# Patient Record
Sex: FEMALE | Race: WHITE | Employment: STUDENT | URBAN - METROPOLITAN AREA
[De-identification: names, ages, dates, MRNs, and addresses within clinical notes are randomized per-mention and may not be internally consistent; named-entity substitution may affect disease eponyms.]

---

## 2017-01-06 ENCOUNTER — TELEPHONE (OUTPATIENT)
Dept: OBGYN CLINIC | Facility: CLINIC | Age: 22
End: 2017-01-06

## 2017-01-26 ENCOUNTER — TELEPHONE (OUTPATIENT)
Dept: OBGYN CLINIC | Facility: CLINIC | Age: 22
End: 2017-01-26

## 2017-01-26 NOTE — TELEPHONE ENCOUNTER
Left message on home and cell for pt   To call regarding scheduling surgery. When is she wanting to schedule? Surgery form in [address].

## 2017-03-28 ENCOUNTER — ANESTHESIA EVENT (OUTPATIENT)
Dept: SURGERY | Facility: HOSPITAL | Age: 22
End: 2017-03-28
Payer: COMMERCIAL

## 2017-03-28 NOTE — H&P
[address] Patient Status:  Hospital Outpatient Surgery    1995 MRN [id number]   Location [facility] SURGERY Attending Maki Guerin MD   [facility] Day #  PCP Ayaan Cervantes MD     [address] History    Para Term  AB SAB TAB Ectopic Multiple Living   0 0 0 0 0 0 0 0 0 0             Past GYN History:   No complaints, regular menses    Social History:     Smoking status: Never Smoker     Smokeless tobacco: Not on file    Alcohol Use echogenicity. RIGHT OVARY:  4.06 cm x 2.29 cm x 2.44 cm    The right ovary appears normal in size, shape, and echogenicity. No significant masses are identified.   LEFT OVARY:  2.84 cm x 1.51 cm x 2.68 cm    The left ovary appears normal in size, shape, an

## 2017-03-29 ENCOUNTER — SURGERY (OUTPATIENT)
Age: 22
End: 2017-03-29

## 2017-03-29 ENCOUNTER — ANESTHESIA (OUTPATIENT)
Dept: SURGERY | Facility: HOSPITAL | Age: 22
End: 2017-03-29
Payer: COMMERCIAL

## 2017-03-29 ENCOUNTER — HOSPITAL ENCOUNTER (OUTPATIENT)
Facility: HOSPITAL | Age: 22
Setting detail: HOSPITAL OUTPATIENT SURGERY
Discharge: HOME | End: 2017-03-29
Attending: OBSTETRICS & GYNECOLOGY | Admitting: OBSTETRICS & GYNECOLOGY
Payer: COMMERCIAL

## 2017-03-29 VITALS
DIASTOLIC BLOOD PRESSURE: 81 MMHG | SYSTOLIC BLOOD PRESSURE: 123 MMHG | OXYGEN SATURATION: 100 % | HEIGHT: 67 IN | RESPIRATION RATE: 12 BRPM | WEIGHT: 243.63 LBS | TEMPERATURE: 98 F | BODY MASS INDEX: 38.24 KG/M2 | HEART RATE: 82 BPM

## 2017-03-29 LAB
POCT LOT NUMBER: NORMAL
POCT URINE PREGNANCY: NEGATIVE

## 2017-03-29 PROCEDURE — 58670 LAPAROSCOPY TUBAL CAUTERY: CPT | Performed by: OBSTETRICS & GYNECOLOGY

## 2017-03-29 PROCEDURE — 0U574ZZ DESTRUCTION OF BILATERAL FALLOPIAN TUBES, PERCUTANEOUS ENDOSCOPIC APPROACH: ICD-10-PCS | Performed by: OBSTETRICS & GYNECOLOGY

## 2017-03-29 RX ORDER — HYDROMORPHONE HYDROCHLORIDE 1 MG/ML
0.4 INJECTION, SOLUTION INTRAMUSCULAR; INTRAVENOUS; SUBCUTANEOUS EVERY 5 MIN PRN
Status: DISCONTINUED | OUTPATIENT
Start: 2017-03-29 | End: 2017-03-29

## 2017-03-29 RX ORDER — HYDROCODONE BITARTRATE AND ACETAMINOPHEN 5; 325 MG/1; MG/1
1 TABLET ORAL AS NEEDED
Status: DISCONTINUED | OUTPATIENT
Start: 2017-03-29 | End: 2017-03-29

## 2017-03-29 RX ORDER — NALOXONE HYDROCHLORIDE 0.4 MG/ML
80 INJECTION, SOLUTION INTRAMUSCULAR; INTRAVENOUS; SUBCUTANEOUS AS NEEDED
Status: DISCONTINUED | OUTPATIENT
Start: 2017-03-29 | End: 2017-03-29

## 2017-03-29 RX ORDER — MIDAZOLAM HYDROCHLORIDE 1 MG/ML
1 INJECTION INTRAMUSCULAR; INTRAVENOUS EVERY 5 MIN PRN
Status: DISCONTINUED | OUTPATIENT
Start: 2017-03-29 | End: 2017-03-29

## 2017-03-29 RX ORDER — SODIUM CHLORIDE, SODIUM LACTATE, POTASSIUM CHLORIDE, CALCIUM CHLORIDE 600; 310; 30; 20 MG/100ML; MG/100ML; MG/100ML; MG/100ML
INJECTION, SOLUTION INTRAVENOUS CONTINUOUS
Status: DISCONTINUED | OUTPATIENT
Start: 2017-03-29 | End: 2017-03-29

## 2017-03-29 RX ORDER — MEPERIDINE HYDROCHLORIDE 25 MG/ML
12.5 INJECTION INTRAMUSCULAR; INTRAVENOUS; SUBCUTANEOUS AS NEEDED
Status: DISCONTINUED | OUTPATIENT
Start: 2017-03-29 | End: 2017-03-29

## 2017-03-29 RX ORDER — ONDANSETRON 2 MG/ML
4 INJECTION INTRAMUSCULAR; INTRAVENOUS AS NEEDED
Status: DISCONTINUED | OUTPATIENT
Start: 2017-03-29 | End: 2017-03-29

## 2017-03-29 RX ORDER — SCOLOPAMINE TRANSDERMAL SYSTEM 1 MG/1
1 PATCH, EXTENDED RELEASE TRANSDERMAL ONCE
Status: DISCONTINUED | OUTPATIENT
Start: 2017-03-29 | End: 2017-03-29

## 2017-03-29 RX ORDER — HYDROCODONE BITARTRATE AND ACETAMINOPHEN 5; 325 MG/1; MG/1
1-2 TABLET ORAL EVERY 4 HOURS PRN
Qty: 20 TABLET | Refills: 0 | Status: SHIPPED | OUTPATIENT
Start: 2017-03-29 | End: 2017-04-24 | Stop reason: ALTCHOICE

## 2017-03-29 RX ORDER — ROPIVACAINE HYDROCHLORIDE 5 MG/ML
INJECTION, SOLUTION EPIDURAL; INFILTRATION; PERINEURAL AS NEEDED
Status: DISCONTINUED | OUTPATIENT
Start: 2017-03-29 | End: 2017-03-29 | Stop reason: HOSPADM

## 2017-03-29 RX ORDER — SCOLOPAMINE TRANSDERMAL SYSTEM 1 MG/1
PATCH, EXTENDED RELEASE TRANSDERMAL
Status: DISCONTINUED
Start: 2017-03-29 | End: 2017-03-29

## 2017-03-29 RX ORDER — HYDROCODONE BITARTRATE AND ACETAMINOPHEN 5; 325 MG/1; MG/1
2 TABLET ORAL AS NEEDED
Status: DISCONTINUED | OUTPATIENT
Start: 2017-03-29 | End: 2017-03-29

## 2017-03-29 NOTE — ANESTHESIA PREPROCEDURE EVALUATION
PRE-OP EVALUATION    Patient Name: Dulce Maria Elias    Pre-op Diagnosis: DESIRES STERILIZATION    Procedure(s):  LAPAROSCOPIC BILATERAL TUBAL LIGATION    Surgeon(s) and Role:     Alma Delia Pope MD - Primary    Pre-op vitals reviewed.         Body mass patient                Plan for GA with ETT placement. Detailed discussion of the risks and benefits of the proposed anesthetic was had in the preoperative area. Questions answered and patient wishes to proceed.        Present on Admission:   **None**

## 2017-03-29 NOTE — OPERATIVE REPORT
PREOPERATIVE DIAGNOSIS:  Desires sterilization    POSTOPERATIVE DIAGNOSIS:  Same                    PROCEDURE PERFORMED: Laparoscopic tubal ligation using electrodessication       SURGEON:  cherri    ANESTHESIA:  General.      ESTIMATED BLOOD LOSS:  5 cc to electrodessicate  A 3-4 cm section of both tubes. No trauma or injury was noted to surrounding organs. The procedure was terminated. The remaining instruments were removed. Abdomen was deflated.   The incisions were closed with a 4-0 Monocryl, and ster

## 2017-03-29 NOTE — BRIEF OP NOTE
[facility] SURGERY  Brief Op Note     Amber Cavanaugh Location: OR   CSN [id number] MRN [id number]   Admission Date 3/29/2017 Operation Date 3/29/2017   Attending Physician Hayden Chery MD Operating Physician Gagandeep Horne MD       Pre-Operati

## 2017-03-29 NOTE — ANESTHESIA POSTPROCEDURE EVALUATION
[address] [place] [address] Patient Status:  Hospital Outpatient Surgery   Age/Gender 19year old female MRN [id number]   [facility] SURGERY Attending Alanna France MD   Hosp Day # 0 PCP Teresa Beebe MD       Anesthesia Post-

## 2017-03-29 NOTE — DISCHARGE SUMMARY
[facility]  Discharge Summary    Dusty Aceves Patient Status:  Hospital Outpatient Surgery    1995 MRN [id number]   [facility] SURGERY Attending Hodan Finney MD   Hosp Day # 0 PCP Jameson Tamayo MD     Date of Admissi

## 2017-03-30 ENCOUNTER — TELEPHONE (OUTPATIENT)
Dept: OBGYN CLINIC | Facility: CLINIC | Age: 22
End: 2017-03-30

## 2017-03-30 NOTE — TELEPHONE ENCOUNTER
Pt calling to schedule Post op from 3/29/17    Please advise where we may make her an appt  [place] [facility] [place] complete;y full    Please advise

## 2017-04-15 ENCOUNTER — OFFICE VISIT (OUTPATIENT)
Dept: OBGYN CLINIC | Facility: CLINIC | Age: 22
End: 2017-04-15

## 2017-04-15 VITALS — BODY MASS INDEX: 30 KG/M2 | SYSTOLIC BLOOD PRESSURE: 110 MMHG | WEIGHT: 192 LBS | DIASTOLIC BLOOD PRESSURE: 60 MMHG

## 2017-04-15 DIAGNOSIS — Z98.890 POST-OPERATIVE STATE: Primary | ICD-10-CM

## 2017-04-15 PROCEDURE — 99024 POSTOP FOLLOW-UP VISIT: CPT | Performed by: OBSTETRICS & GYNECOLOGY

## 2017-04-24 ENCOUNTER — OFFICE VISIT (OUTPATIENT)
Dept: FAMILY MEDICINE CLINIC | Facility: CLINIC | Age: 22
End: 2017-04-24

## 2017-04-24 VITALS
OXYGEN SATURATION: 97 % | HEART RATE: 73 BPM | DIASTOLIC BLOOD PRESSURE: 78 MMHG | TEMPERATURE: 99 F | WEIGHT: 240.38 LBS | SYSTOLIC BLOOD PRESSURE: 116 MMHG | BODY MASS INDEX: 39.57 KG/M2 | HEIGHT: 65.5 IN | RESPIRATION RATE: 16 BRPM

## 2017-04-24 DIAGNOSIS — L02.214 ABSCESS OF GROIN, RIGHT: Primary | ICD-10-CM

## 2017-04-24 PROCEDURE — 99212 OFFICE O/P EST SF 10 MIN: CPT | Performed by: FAMILY MEDICINE

## 2017-04-24 RX ORDER — SULFAMETHOXAZOLE AND TRIMETHOPRIM 800; 160 MG/1; MG/1
1 TABLET ORAL 2 TIMES DAILY
Qty: 10 TABLET | Refills: 0 | Status: SHIPPED | OUTPATIENT
Start: 2017-04-24 | End: 2019-04-03

## 2017-04-24 NOTE — PATIENT INSTRUCTIONS
Bactrim DS (sulfa antibiotic) twice a day for 5 days to try to eliminate any remaining staph infection which is the probable cause of your boil/abscess/cyst. call if you get rash, nausea, diarrhea, fever or abdominal pain, or headaches or confusion. (rash

## 2017-04-24 NOTE — PROGRESS NOTES
Unique Antonio IS A 23year old female HERE FOR Patient presents with:  Cyst: cyst on right leg inner thigh since last Thursday. was hurting at first but now it is draining out       History of present illness:      This is first on groin, has had inguina History Main Topics   Smoking status: Never Smoker     Smokeless tobacco: Never Used    Alcohol Use: Yes  0.0 oz/week    0 Standard drinks or equivalent per week         Comment: occasionally    Drug Use: No    Sexual Activity: Yes    Partners: Male     Ot confusion. (rash or stomach symptoms are more common, fever or headache or confusion rarer.)

## 2017-11-29 ENCOUNTER — OFFICE VISIT (OUTPATIENT)
Dept: OBGYN CLINIC | Facility: CLINIC | Age: 22
End: 2017-11-29

## 2017-11-29 VITALS
DIASTOLIC BLOOD PRESSURE: 76 MMHG | SYSTOLIC BLOOD PRESSURE: 132 MMHG | BODY MASS INDEX: 36.8 KG/M2 | HEIGHT: 66 IN | WEIGHT: 229 LBS

## 2017-11-29 DIAGNOSIS — Z01.419 WELL FEMALE EXAM WITH ROUTINE GYNECOLOGICAL EXAM: Primary | ICD-10-CM

## 2017-11-29 DIAGNOSIS — L68.0 IDIOPATHIC HIRSUTISM: ICD-10-CM

## 2017-11-29 PROCEDURE — G0439 PPPS, SUBSEQ VISIT: HCPCS | Performed by: OBSTETRICS & GYNECOLOGY

## 2017-11-29 PROCEDURE — 99395 PREV VISIT EST AGE 18-39: CPT | Performed by: OBSTETRICS & GYNECOLOGY

## 2017-11-29 NOTE — PROGRESS NOTES
GYN H&P     2017  1:21 PM    CC: Patient is here for annual    HPI: patient is a 23year old  here for her annual gyn exam.   She has no complaints. Menses are regular. Denies any pelvic pain or irregular vaginal discharge.    Contraception: t/ History Main Topics   Smoking status: Never Smoker    Smokeless tobacco: Never Used    Alcohol use Yes  0.0 oz/week     Comment: occasionally    Drug use: No    Sexual activity: Yes    Partners: Male     Other Topics Concern     Service No    Blood LMP 11/17/2017 (Exact Date)   BMI 36.96 kg/m²   Exam:   GENERAL: well developed, well nourished, in no apparent distress  SKIN: no rashes, no suspicious lesions, generalized hirsuitism  HEENT: normal  NECK: supple; no thyroidmegaly, no adenopathy  LUNGS: c

## 2018-03-22 ENCOUNTER — OFFICE VISIT (OUTPATIENT)
Dept: FAMILY MEDICINE CLINIC | Facility: CLINIC | Age: 23
End: 2018-03-22

## 2018-03-22 VITALS
SYSTOLIC BLOOD PRESSURE: 124 MMHG | HEART RATE: 87 BPM | DIASTOLIC BLOOD PRESSURE: 80 MMHG | WEIGHT: 234 LBS | TEMPERATURE: 100 F | OXYGEN SATURATION: 97 % | HEIGHT: 66 IN | RESPIRATION RATE: 17 BRPM | BODY MASS INDEX: 37.61 KG/M2

## 2018-03-22 DIAGNOSIS — Z00.00 WELL ADULT EXAM: Primary | ICD-10-CM

## 2018-03-22 DIAGNOSIS — L68.0 HIRSUTISM: ICD-10-CM

## 2018-03-22 DIAGNOSIS — R79.89 LFT ELEVATION: ICD-10-CM

## 2018-03-22 DIAGNOSIS — Q83.9 CONGENITAL MALFORMATION OF BREAST: ICD-10-CM

## 2018-03-22 DIAGNOSIS — Z11.3 SCREEN FOR STD (SEXUALLY TRANSMITTED DISEASE): ICD-10-CM

## 2018-03-22 PROCEDURE — 99395 PREV VISIT EST AGE 18-39: CPT | Performed by: FAMILY MEDICINE

## 2018-03-22 PROCEDURE — 99213 OFFICE O/P EST LOW 20 MIN: CPT | Performed by: FAMILY MEDICINE

## 2018-03-22 NOTE — PROGRESS NOTES
Gualberto Lindo is a 23year old female here for Patient presents with: Well Adult: Physical no pap       HPI:   Patient complains of here for well exam.     Has a breast condition, drooping and she tends to have rounded slumped shoulder posture.  Father' Paternal Grandfather    • Other Gwenevere Taya Mother      gallstones hx   • Diabetes Paternal Aunt    • Diabetes Paternal Uncle        Social history:    Social History  Social History   Marital status: Single  Spouse name: N/A    Years of education: N/A  Number diarrhea, constipation, or blood in stool. : No pain, frequency, urgency or incontinence with urination.   OB/GYN: Not pregnant, menses regular, not excessive in amount, has some dysmenorrhea. + sexual activity since last exam.  Rheumatologic: HPI  Derm/ 03/29/2017 [id number]   Final   • Procedure Control 03/29/2017 ok   Final   • EXPIRATION DATE 03/29/2017 4/2018   Final           ASSESSMENT AND PLAN:   Jennifer Ute was seen today for well adult.     Diagnoses and all orders for this visit:    Hirsutism  -     FABRIZIO

## 2018-03-22 NOTE — PATIENT INSTRUCTIONS
Health screening: Pap recommended beginning at age 24 and every 3 years, repeat next year. STD screening: if sexually active, chlamydia and gonorrhea testing recommended if single and/or under age 22.  Tdap or Td recommended if tetanus not received for 10 y

## 2018-03-26 PROBLEM — H10.10 ALLERGIC CONJUNCTIVITIS: Status: ACTIVE | Noted: 2018-03-26

## 2018-03-26 PROBLEM — Q84.2: Status: ACTIVE | Noted: 2018-03-26

## 2018-03-26 PROBLEM — H91.92: Status: ACTIVE | Noted: 2018-03-26

## 2018-03-26 PROBLEM — K06.1 GINGIVAL HYPERPLASIA: Status: ACTIVE | Noted: 2018-03-26

## 2018-03-26 PROBLEM — J30.9 ALLERGIC RHINITIS: Status: ACTIVE | Noted: 2018-03-26

## 2018-03-26 PROBLEM — H91.91 HEARING LOSS IN RIGHT EAR: Status: ACTIVE | Noted: 2018-03-26

## 2018-04-04 ENCOUNTER — TELEPHONE (OUTPATIENT)
Dept: FAMILY MEDICINE CLINIC | Facility: CLINIC | Age: 23
End: 2018-04-04

## 2018-05-08 ENCOUNTER — OFFICE VISIT (OUTPATIENT)
Dept: SURGERY | Facility: CLINIC | Age: 23
End: 2018-05-08

## 2018-05-08 VITALS — WEIGHT: 235.38 LBS | BODY MASS INDEX: 37.83 KG/M2 | HEIGHT: 66 IN

## 2018-05-08 DIAGNOSIS — N64.82 TUBULAR BREAST: Primary | Chronic | ICD-10-CM

## 2018-05-08 PROCEDURE — 99243 OFF/OP CNSLTJ NEW/EST LOW 30: CPT | Performed by: SURGERY

## 2018-05-08 NOTE — CONSULTS
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 History of Present Illness:    The patient is a 20year old referred by Dr. Anastasiia Luciano for evaluation of a congeni Rfl:      No current facility-administered medications on file prior to visit.        Allergies:    No Known Allergies      Family History:   Family History   Problem Relation Age of Onset   • Diabetes Paternal Grandmother    • High Blood Pressure Paternal Genitourinary:  The patient denies frequent urination, needing to get up at night to urinate, urinary hesitancy or retaining urine, painful urination, urinary incontinence, decreased urine stream, blood in the urine, or vaginal/penile discharge.     Ski masses noted bilaterally. Significant axillary lipodystrophy is noted. Measurements: Sternal notch to nipple 40 cm bilaterally. Base diameter 19 cm bilaterally. Nipple to inframammary fold 17 cm left and 18 cm on the right. Abdomen:  An obese abdomen

## 2019-04-03 ENCOUNTER — OFFICE VISIT (OUTPATIENT)
Dept: FAMILY MEDICINE CLINIC | Facility: CLINIC | Age: 24
End: 2019-04-03

## 2019-04-03 VITALS
HEIGHT: 66 IN | SYSTOLIC BLOOD PRESSURE: 108 MMHG | HEART RATE: 85 BPM | TEMPERATURE: 99 F | RESPIRATION RATE: 17 BRPM | OXYGEN SATURATION: 98 % | BODY MASS INDEX: 40.61 KG/M2 | WEIGHT: 252.69 LBS | DIASTOLIC BLOOD PRESSURE: 80 MMHG

## 2019-04-03 DIAGNOSIS — Z01.419 WELL WOMAN EXAM: Primary | ICD-10-CM

## 2019-04-03 DIAGNOSIS — Z13.1 SCREENING FOR DIABETES MELLITUS: ICD-10-CM

## 2019-04-03 DIAGNOSIS — Z13.0 SCREENING FOR DEFICIENCY ANEMIA: ICD-10-CM

## 2019-04-03 DIAGNOSIS — Z13.220 SCREENING, LIPID: ICD-10-CM

## 2019-04-03 DIAGNOSIS — D59.12: ICD-10-CM

## 2019-04-03 DIAGNOSIS — Z13.29 SCREENING FOR THYROID DISORDER: ICD-10-CM

## 2019-04-03 PROCEDURE — 99395 PREV VISIT EST AGE 18-39: CPT | Performed by: FAMILY MEDICINE

## 2019-04-03 PROCEDURE — 88175 CYTOPATH C/V AUTO FLUID REDO: CPT | Performed by: FAMILY MEDICINE

## 2019-04-03 PROCEDURE — 87491 CHLMYD TRACH DNA AMP PROBE: CPT | Performed by: FAMILY MEDICINE

## 2019-04-03 PROCEDURE — 87591 N.GONORRHOEAE DNA AMP PROB: CPT | Performed by: FAMILY MEDICINE

## 2019-04-03 PROCEDURE — 90472 IMMUNIZATION ADMIN EACH ADD: CPT | Performed by: FAMILY MEDICINE

## 2019-04-03 PROCEDURE — 90471 IMMUNIZATION ADMIN: CPT | Performed by: FAMILY MEDICINE

## 2019-04-03 PROCEDURE — 90715 TDAP VACCINE 7 YRS/> IM: CPT | Performed by: FAMILY MEDICINE

## 2019-04-03 PROCEDURE — 90686 IIV4 VACC NO PRSV 0.5 ML IM: CPT | Performed by: FAMILY MEDICINE

## 2019-04-03 NOTE — PROGRESS NOTES
Ana Maher is a 20year old female here for Patient presents with:  Physical: W/pap       HPI:   Patient complains of here for well exam.     Recovering from a cold now. Ate more in winter. Trying to balance diet & life. vapes. 1-2x/week.  Avoid on file      Transportation needs:        Medical: Not on file        Non-medical: Not on file    Tobacco Use      Smoking status: Never Smoker      Smokeless tobacco: Never Used    Substance and Sexual Activity      Alcohol use: No        Alcohol/week: 0. probably lifestyle related. Eye: no glasses or contacts  ENT: feels hearing is ok.  Recent URI  Heme/lymph:no problems  Cardiovascular: no palpitations, dyspnea on exertion or fainting, no chest pain with exertion  GI: no complaints  : no hematuria, freq speech normal, DTR's equal bilaterally. Recent/past labs and consults reviewed.   Admission on 03/29/2017, Discharged on 03/29/2017   Component Date Value Ref Range Status   • POCT urine pregnancy 03/29/2017 Negative   Final   • POCT LOT NUMBER 03/29/201

## 2019-04-29 ENCOUNTER — LABORATORY ENCOUNTER (OUTPATIENT)
Dept: LAB | Age: 24
End: 2019-04-29
Attending: FAMILY MEDICINE
Payer: COMMERCIAL

## 2019-04-29 DIAGNOSIS — Z13.29 SCREENING FOR THYROID DISORDER: ICD-10-CM

## 2019-04-29 DIAGNOSIS — Z13.0 SCREENING FOR DEFICIENCY ANEMIA: ICD-10-CM

## 2019-04-29 DIAGNOSIS — Z13.220 SCREENING, LIPID: ICD-10-CM

## 2019-04-29 DIAGNOSIS — Z13.1 SCREENING FOR DIABETES MELLITUS: ICD-10-CM

## 2019-04-29 PROCEDURE — 84439 ASSAY OF FREE THYROXINE: CPT

## 2019-04-29 PROCEDURE — 83036 HEMOGLOBIN GLYCOSYLATED A1C: CPT

## 2019-04-29 PROCEDURE — 85025 COMPLETE CBC W/AUTO DIFF WBC: CPT

## 2019-04-29 PROCEDURE — 80053 COMPREHEN METABOLIC PANEL: CPT

## 2019-04-29 PROCEDURE — 80061 LIPID PANEL: CPT

## 2019-04-29 PROCEDURE — 84443 ASSAY THYROID STIM HORMONE: CPT

## 2019-04-29 PROCEDURE — 36415 COLL VENOUS BLD VENIPUNCTURE: CPT

## 2019-04-30 ENCOUNTER — TELEPHONE (OUTPATIENT)
Dept: FAMILY MEDICINE CLINIC | Facility: CLINIC | Age: 24
End: 2019-04-30

## 2019-04-30 PROBLEM — R79.89 LFT ELEVATION: Status: ACTIVE | Noted: 2019-04-30

## 2019-04-30 NOTE — TELEPHONE ENCOUNTER
Aury Read MD 13 minutes ago (3:31 PM)         Let patient know about high liver enzymes, refer Dr. Monika Fowler and ordered abdominal ultrasound, needs to call central scheduling to set that up.       Unsigned   Documentation

## 2019-05-01 NOTE — TELEPHONE ENCOUNTER
It's ok to wait until new insurance is in place, this has been present for a few years and I doubt hepatitis is present.

## 2019-05-01 NOTE — TELEPHONE ENCOUNTER
Written by Alicia Glovre MD on 4/30/2019  3:33 PM   Thyroid, sugar and kidneys, average sugar, are normal. Liver enzymes are high but slightly better than 3 years ago.  total cholesterol is normal, triglycerides slightly high, LDL cholesterol normal.

## 2019-05-02 NOTE — TELEPHONE ENCOUNTER
Patient notified of Dr. Melanie Slade comment that OK to wait for follow up until new insurance is in place.

## 2021-04-18 ENCOUNTER — IMMUNIZATION (OUTPATIENT)
Dept: LAB | Facility: HOSPITAL | Age: 26
End: 2021-04-18
Attending: EMERGENCY MEDICINE
Payer: OTHER GOVERNMENT

## 2021-04-18 DIAGNOSIS — Z23 NEED FOR VACCINATION: Primary | ICD-10-CM

## 2021-04-18 PROCEDURE — 0011A SARSCOV2 VAC 100MCG/0.5ML IM: CPT

## 2021-05-16 ENCOUNTER — IMMUNIZATION (OUTPATIENT)
Dept: LAB | Facility: HOSPITAL | Age: 26
End: 2021-05-16
Attending: EMERGENCY MEDICINE
Payer: OTHER GOVERNMENT

## 2021-05-16 DIAGNOSIS — Z23 NEED FOR VACCINATION: Primary | ICD-10-CM

## 2021-05-16 PROCEDURE — 0012A SARSCOV2 VAC 100MCG/0.5ML IM: CPT

## 2022-01-21 ENCOUNTER — IMMUNIZATION (OUTPATIENT)
Dept: LAB | Facility: HOSPITAL | Age: 27
End: 2022-01-21
Attending: EMERGENCY MEDICINE
Payer: OTHER GOVERNMENT

## 2022-01-21 DIAGNOSIS — Z23 NEED FOR VACCINATION: Primary | ICD-10-CM

## 2022-01-21 PROCEDURE — 0064A SARSCOV2 VAC 50MCG/0.25ML IM: CPT

## 2022-06-19 ENCOUNTER — OFFICE VISIT (OUTPATIENT)
Dept: FAMILY MEDICINE CLINIC | Facility: CLINIC | Age: 27
End: 2022-06-19

## 2022-06-19 DIAGNOSIS — Z02.9 ADMINISTRATIVE ENCOUNTER: Primary | ICD-10-CM

## 2022-06-19 NOTE — PROGRESS NOTES
Patient presents to the [facility] with her Father with a complaint of a swollen right upper eye lid for over 2 months. Patient states that the swollen area to her upper lid was tender at first, but has not been tender or changed for about 2 months. Patient states that she wanted this treated for 'appearance reasons'. Never had drainage. No vision changes. Patient does not wear contacts. Approx. 10-12 mm diameter firm round mass to right upper lid. Mass is well differentiated at midpoint of lid and center extends beyond the inferior lid border. Mass is reddened, but not erythremic or tender. Upper lid everted with no internal pustules or lesions. Discussed that this was likely a stye that never evacuated and formed a Chalazion. Patient requests that we also discuss with her Father. Patient was referred to Ophthalmology. Patient is uninsured and self-pay. Provided referrals to patient and Father. Father also sees an Ophthalmologist who he can contact for an appointment. [facility] visit cancel and charge was reimbursed to family.

## 2023-05-05 ENCOUNTER — TELEPHONE (OUTPATIENT)
Dept: FAMILY MEDICINE CLINIC | Facility: CLINIC | Age: 28
End: 2023-05-05

## (undated) DEVICE — #15 STERILE STAINLESS BLADE: Brand: STERILE STAINLESS BLADES

## (undated) DEVICE — ENDOPATH XCEL UNIVERSAL TROCAR STABLILITY SLEEVES: Brand: ENDOPATH XCEL

## (undated) DEVICE — GYN LAP CDS: Brand: MEDLINE INDUSTRIES, INC.

## (undated) DEVICE — BANDAID COVERLET 1X3

## (undated) DEVICE — VIOLET BRAIDED (POLYGLACTIN 910), SYNTHETIC ABSORBABLE SUTURE: Brand: COATED VICRYL

## (undated) DEVICE — SOL  .9 1000ML BTL

## (undated) DEVICE — KENDALL SCD EXPRESS SLEEVES, KNEE LENGTH, MEDIUM: Brand: KENDALL SCD

## (undated) DEVICE — GLOVE BIOGEL M SURG SZ 6-1/2

## (undated) DEVICE — TROCAR BLADELESS 11MM B11LT

## (undated) DEVICE — UNDYED BRAIDED (POLYGLACTIN 910), SYNTHETIC ABSORBABLE SUTURE: Brand: COATED VICRYL

## (undated) DEVICE — ENDOPATH XCEL WITH OPTIVIEW TECHNOLOGY BLADELESS TROCARS WITH STABILITY SLEEVES: Brand: ENDOPATH XCEL OPTIVIEW

## (undated) NOTE — LETTER
04/21/18        Karolina Gens  [address] [address]      Dear Lisa Cheatham,    [address] records indicate that you have outstanding lab work and or testing that was ordered for you and has not yet been completed:          Chlamydia SDA [E]

## (undated) NOTE — MR AVS SNAPSHOT
[address], [place] [address] [address] [phone]               Thank you for choosing us for your health care visit with Sharon Silva MD.  We are glad to serve you and happy to provide you with this [place] Your unique [employer] Access Code: [email or web address]  Expires: 6/14/2017 10:30 AM    If you have questions, you can call [phone] to talk to our [facility] Staff. Remember, [employer] is NOT to be used for urgent needs. For medical emergencies, dial 911.

## (undated) NOTE — LETTER
Pre Surgical Consent  Lori Pisano MD     1. I authorize the performance upon myself of the following operation,  Procedure(s):  2.  LAPAROSCOPIC BILATERAL TUBAL LIGATION  to be performed by or under the [facility]

## (undated) NOTE — IP AVS SNAPSHOT
[facility] [place] [address] [place], [address] ~ [phone]                Discharge Summary   3/29/2017    Ms. Amber Cavanaugh           Admission Information        Provider Department    3/29/2017 Gagandeep Horne MD  Pre-Op After surgery you will feel some discomfort that usually disappears in a day including   1. A scratchy throat from the breathing tube used during general anesthesia. 2. You may eat a diet as tolerated.      3. Mild nausea from the medication or the pro Do not take any Anti Inflammatory like Motrin, Aleve or Ibuprophen until after: 2:15 PM  Please report any suspected allergic reactions or bleeding issues to your doctor    General Post Op Instructions:  1.  Do some nice big deep breathing-   This can preve Do not drink alcohol or take sedatives while wearing the patch          Instructions and Information about Your Health     None      Follow-up Information     Follow up with Socorro Greene MD. Schedule an appointment as soon as possible for a visit in Enter your [employer] Activation Code exactly as it appears below along with your Zip Code and Date of Birth to complete the sign-up process. If you do not sign up before the expiration date, you must request a new code.     Your unique [employer] Access Code: [email or web address]

## (undated) NOTE — MR AVS SNAPSHOT
[address] [place] [id number] [phone]               Thank you for choosing us for your health care visit with Eligio Hobson MD.  We are glad to serve you and happy to provide you with this [facility] [phone], [phone]  [address] [place] [address], [address] [place] [address]     Phone:  [phone]    - Sulfamethoxazole-TMP -160 MG Tabs per tablet            [facility]     Sign up for [facility] [facility], your secure online medical record.   [facility] will allow